# Patient Record
Sex: FEMALE | Race: WHITE | NOT HISPANIC OR LATINO | Employment: UNEMPLOYED | URBAN - METROPOLITAN AREA
[De-identification: names, ages, dates, MRNs, and addresses within clinical notes are randomized per-mention and may not be internally consistent; named-entity substitution may affect disease eponyms.]

---

## 2017-03-01 RX ORDER — ETONOGESTREL/ETHINYL ESTRADIOL .12-.015MG
RING, VAGINAL VAGINAL
Qty: 1 EACH | Refills: 4 | OUTPATIENT
Start: 2017-03-01

## 2019-06-06 ENCOUNTER — LAB (OUTPATIENT)
Dept: LAB | Facility: HOSPITAL | Age: 49
End: 2019-06-06

## 2019-06-06 ENCOUNTER — TRANSCRIBE ORDERS (OUTPATIENT)
Dept: ADMINISTRATIVE | Facility: HOSPITAL | Age: 49
End: 2019-06-06

## 2019-06-06 DIAGNOSIS — Z00.00 ROUTINE GENERAL MEDICAL EXAMINATION AT A HEALTH CARE FACILITY: ICD-10-CM

## 2019-06-06 DIAGNOSIS — Z00.00 ROUTINE GENERAL MEDICAL EXAMINATION AT A HEALTH CARE FACILITY: Primary | ICD-10-CM

## 2019-06-06 LAB
ALBUMIN SERPL-MCNC: 3.8 G/DL (ref 3.5–5.2)
ALBUMIN/GLOB SERPL: 1.4 G/DL
ALP SERPL-CCNC: 65 U/L (ref 39–117)
ALT SERPL W P-5'-P-CCNC: 16 U/L (ref 1–33)
ANION GAP SERPL CALCULATED.3IONS-SCNC: 11.4 MMOL/L
AST SERPL-CCNC: 21 U/L (ref 1–32)
BACTERIA UR QL AUTO: ABNORMAL /HPF
BASOPHILS # BLD AUTO: 0.06 10*3/MM3 (ref 0–0.2)
BASOPHILS NFR BLD AUTO: 1 % (ref 0–1.5)
BILIRUB SERPL-MCNC: 0.7 MG/DL (ref 0.2–1.2)
BILIRUB UR QL STRIP: NEGATIVE
BUN BLD-MCNC: 13 MG/DL (ref 6–20)
BUN/CREAT SERPL: 14.4 (ref 7–25)
CALCIUM SPEC-SCNC: 9.2 MG/DL (ref 8.6–10.5)
CHLORIDE SERPL-SCNC: 101 MMOL/L (ref 98–107)
CHOLEST SERPL-MCNC: 195 MG/DL (ref 0–200)
CLARITY UR: CLEAR
CO2 SERPL-SCNC: 24.6 MMOL/L (ref 22–29)
COLOR UR: YELLOW
CREAT BLD-MCNC: 0.9 MG/DL (ref 0.57–1)
DEPRECATED RDW RBC AUTO: 41.1 FL (ref 37–54)
EOSINOPHIL # BLD AUTO: 0.13 10*3/MM3 (ref 0–0.4)
EOSINOPHIL NFR BLD AUTO: 2.2 % (ref 0.3–6.2)
ERYTHROCYTE [DISTWIDTH] IN BLOOD BY AUTOMATED COUNT: 11.7 % (ref 12.3–15.4)
GFR SERPL CREATININE-BSD FRML MDRD: 66 ML/MIN/1.73
GLOBULIN UR ELPH-MCNC: 2.8 GM/DL
GLUCOSE BLD-MCNC: 89 MG/DL (ref 65–99)
GLUCOSE UR STRIP-MCNC: NEGATIVE MG/DL
HCT VFR BLD AUTO: 40.1 % (ref 34–46.6)
HDLC SERPL-MCNC: 96 MG/DL (ref 40–60)
HGB BLD-MCNC: 13.1 G/DL (ref 12–15.9)
HGB UR QL STRIP.AUTO: ABNORMAL
HYALINE CASTS UR QL AUTO: ABNORMAL /LPF
IMM GRANULOCYTES # BLD AUTO: 0.02 10*3/MM3 (ref 0–0.05)
IMM GRANULOCYTES NFR BLD AUTO: 0.3 % (ref 0–0.5)
KETONES UR QL STRIP: NEGATIVE
LDLC SERPL CALC-MCNC: 90 MG/DL (ref 0–100)
LDLC/HDLC SERPL: 0.94 {RATIO}
LEUKOCYTE ESTERASE UR QL STRIP.AUTO: ABNORMAL
LYMPHOCYTES # BLD AUTO: 1.53 10*3/MM3 (ref 0.7–3.1)
LYMPHOCYTES NFR BLD AUTO: 26.1 % (ref 19.6–45.3)
MCH RBC QN AUTO: 31.3 PG (ref 26.6–33)
MCHC RBC AUTO-ENTMCNC: 32.7 G/DL (ref 31.5–35.7)
MCV RBC AUTO: 95.7 FL (ref 79–97)
MONOCYTES # BLD AUTO: 0.47 10*3/MM3 (ref 0.1–0.9)
MONOCYTES NFR BLD AUTO: 8 % (ref 5–12)
NEUTROPHILS # BLD AUTO: 3.65 10*3/MM3 (ref 1.7–7)
NEUTROPHILS NFR BLD AUTO: 62.4 % (ref 42.7–76)
NITRITE UR QL STRIP: NEGATIVE
NRBC BLD AUTO-RTO: 0 /100 WBC (ref 0–0.2)
PH UR STRIP.AUTO: 5.5 [PH] (ref 5–8)
PLATELET # BLD AUTO: 218 10*3/MM3 (ref 140–450)
PMV BLD AUTO: 11.1 FL (ref 6–12)
POTASSIUM BLD-SCNC: 4.2 MMOL/L (ref 3.5–5.2)
PROT SERPL-MCNC: 6.6 G/DL (ref 6–8.5)
PROT UR QL STRIP: NEGATIVE
RBC # BLD AUTO: 4.19 10*6/MM3 (ref 3.77–5.28)
RBC # UR: ABNORMAL /HPF
REF LAB TEST METHOD: ABNORMAL
SODIUM BLD-SCNC: 137 MMOL/L (ref 136–145)
SP GR UR STRIP: 1.02 (ref 1–1.03)
SQUAMOUS #/AREA URNS HPF: ABNORMAL /HPF
TRIGL SERPL-MCNC: 45 MG/DL (ref 0–150)
UROBILINOGEN UR QL STRIP: ABNORMAL
VLDLC SERPL-MCNC: 9 MG/DL (ref 5–40)
WBC NRBC COR # BLD: 5.86 10*3/MM3 (ref 3.4–10.8)
WBC UR QL AUTO: ABNORMAL /HPF

## 2019-06-06 PROCEDURE — 36415 COLL VENOUS BLD VENIPUNCTURE: CPT

## 2019-06-06 PROCEDURE — 80061 LIPID PANEL: CPT | Performed by: INTERNAL MEDICINE

## 2019-06-06 PROCEDURE — 85025 COMPLETE CBC W/AUTO DIFF WBC: CPT | Performed by: INTERNAL MEDICINE

## 2019-06-06 PROCEDURE — 81001 URINALYSIS AUTO W/SCOPE: CPT | Performed by: INTERNAL MEDICINE

## 2019-06-06 PROCEDURE — 80053 COMPREHEN METABOLIC PANEL: CPT | Performed by: INTERNAL MEDICINE

## 2020-02-21 ENCOUNTER — TELEPHONE (OUTPATIENT)
Dept: OBSTETRICS AND GYNECOLOGY | Age: 50
End: 2020-02-21

## 2020-02-21 NOTE — TELEPHONE ENCOUNTER
Pt called to schedule AE. She could not remember who she saw because it has been over 3 years. It looks like she saw you in the past but pt is requesting to be seen out in [place] as it is more convenient for her. Pt was informed that you only see pts at [place] and was wondering if she could switch providers to a provider out at [place]. I told pt I would have to send a message back to you first and then I would give her a call back on Monday as you are not in office again until Monday.

## 2020-02-24 NOTE — TELEPHONE ENCOUNTER
Left voicemail that pt can see someone in [place]. Advised pt to call back if she has any questions.

## 2020-06-26 ENCOUNTER — OFFICE VISIT (OUTPATIENT)
Dept: OBSTETRICS AND GYNECOLOGY | Age: 50
End: 2020-06-26

## 2020-06-26 VITALS
WEIGHT: 142 LBS | DIASTOLIC BLOOD PRESSURE: 70 MMHG | BODY MASS INDEX: 24.24 KG/M2 | SYSTOLIC BLOOD PRESSURE: 110 MMHG | HEIGHT: 64 IN

## 2020-06-26 DIAGNOSIS — Z12.4 SCREENING FOR CERVICAL CANCER: ICD-10-CM

## 2020-06-26 DIAGNOSIS — Z12.31 SCREENING MAMMOGRAM, ENCOUNTER FOR: Primary | ICD-10-CM

## 2020-06-26 DIAGNOSIS — Z01.419 ENCOUNTER FOR GYNECOLOGICAL EXAMINATION WITHOUT ABNORMAL FINDING: ICD-10-CM

## 2020-06-26 PROCEDURE — 99386 PREV VISIT NEW AGE 40-64: CPT | Performed by: OBSTETRICS & GYNECOLOGY

## 2020-06-26 NOTE — PROGRESS NOTES
Routine Annual Visit    2020    Patient: Preethi Johnson          MR#:[id number]      Chief Complaint   Patient presents with   • Gynecologic Exam     New pt. no pap or MG on file. colonoscopy 4-5 months ago per pt. no complaints today        History of Present Illness    50 y.o. female  who presents for annual exam. She works in [place] but [facility]  Menopausal for about 15 months. Has hot flashes and have gotten better, mostly at night.  Not interested in HRT    Going through a divorce that has lasted a year, living with her  and all her family    Health Maintenance  Gardasil none  Last pap: more than 3 yrs ago, history abnormal: one when pregnant with second child, colpo but no leep and normal since  Mammogram: years ago  Colonoscopy 4-5 mo ago per pt, normal and 10 yr follow up  Family history of Breast, ovarian, uterine, colon, pancreatic cancer: yes - half sister with breast cancer  DEXA: none  PCP Dr. Roger Pickard     Current contraception: PM    No LMP recorded. Patient is postmenopausal.  Obstetric History:  OB History        3    Para   2    Term   2            AB   1    Living   2       SAB   1    TAB        Ectopic        Molar        Multiple        Live Births   2               Menstrual History:     No LMP recorded. Patient is postmenopausal.       ________________________________________  There is no problem list on file for this patient.      Past Medical History:   Diagnosis Date   • Migraine        Family History   Problem Relation Age of Onset   • Breast cancer Sister    • Ovarian cancer Neg Hx    • Uterine cancer Neg Hx    • Colon cancer Neg Hx    • Melanoma Neg Hx    • Prostate cancer Neg Hx        Past Surgical History:   Procedure Laterality Date   • TONSILLECTOMY         Social History     Tobacco Use   Smoking Status Never Smoker   Smokeless Tobacco Never Used       currently has no medications in their medication  "list.  ________________________________________      The following portions of the patient's history were reviewed and updated as appropriate: allergies, current medications, past family history, past medical history, past social history, past surgical history and problem list.    Review of Systems   Constitutional: Negative for fever and unexpected weight change.   Respiratory: Negative for shortness of breath.    Cardiovascular: Negative for chest pain.   Gastrointestinal: Negative for abdominal pain, constipation and diarrhea.   Genitourinary: Negative for frequency and urgency.   Neurological: Positive for headaches.   Hematological: Negative for adenopathy.   Psychiatric/Behavioral: Negative for dysphoric mood.       Objective   Physical Exam    /70   Ht 162.6 cm (64\")   Wt 64.4 kg (142 lb)   Breastfeeding No   BMI 24.37 kg/m²    BP Readings from Last 3 Encounters:   06/26/20 110/70      Wt Readings from Last 3 Encounters:   06/26/20 64.4 kg (142 lb)         BMI: Body mass index is 24.37 kg/m².       General:   alert, appears stated age and cooperative   Neck: No thyromegaly or LAD, no carotid bruit noted   Heart:: regular rate and rhythm, S1, S2 normal, no murmur, click, rub or gallop   Lungs: normal respiratory effort and auscultation   Abdomen: soft, non-tender, without masses or organomegaly   Breast: inspection negative, no nipple discharge or bleeding, no masses or nodularity palpable   Urethra and bladder: urethral meatus normal; bladder nontender to palpation;   Vulva: normal, Bartholin's, Urethra, [place]'s normal   Vagina: atrophic mucosa, normal discharge   Cervix: multiparous appearance and no lesions   Uterus: normal size or anteverted   Adnexa: normal adnexa and no mass, fullness, tenderness       Assessment:    normal annual exam   Menopause  Screen for breast and cervical cancer    Plan:    Plan     [x]  Mammogram request made  [x]  PAP done  []  Labs:   []  GC/Chl/TV  []  DEXA scan   [] "  Referral for colonoscopy:         Counseling  Reviewed changes a/w menopause, denies need for HRT  [x]  Nutrition  [x]  Physical activity/regular exercise   [x]  Healthy weight  []  Injury prevention  []  Smoking cessation  []  Substance misuse/abuse  [x]  Sexual behavior  []  STD prevention  []  Contraception  []  Dental health  [x]  Mental health  []  Immunization  [x]  Encouraged REHANA Patel MD  06/26/2020  10:00

## 2020-07-02 LAB
CYTOLOGIST CVX/VAG CYTO: NORMAL
CYTOLOGY CVX/VAG DOC CYTO: NORMAL
CYTOLOGY CVX/VAG DOC THIN PREP: NORMAL
DX ICD CODE: NORMAL
HIV 1 & 2 AB SER-IMP: NORMAL
HPV I/H RISK 1 DNA CVX QL PROBE+SIG AMP: NEGATIVE
Lab: NORMAL
OTHER STN SPEC: NORMAL
RECOM F/U CVX/VAG CYTO: NORMAL
STAT OF ADQ CVX/VAG CYTO-IMP: NORMAL

## 2020-08-04 ENCOUNTER — OFFICE VISIT (OUTPATIENT)
Dept: OBSTETRICS AND GYNECOLOGY | Age: 50
End: 2020-08-04

## 2020-08-04 VITALS
SYSTOLIC BLOOD PRESSURE: 102 MMHG | HEIGHT: 64 IN | BODY MASS INDEX: 24.41 KG/M2 | WEIGHT: 143 LBS | DIASTOLIC BLOOD PRESSURE: 60 MMHG

## 2020-08-04 DIAGNOSIS — Z12.4 SCREENING FOR MALIGNANT NEOPLASM OF CERVIX: Primary | ICD-10-CM

## 2020-08-04 NOTE — PROGRESS NOTES
"Chief Complaint   Patient presents with   • Follow-up     repeat pap smear. last pap 6/26/20 (-)      Preethi Johnson  Is here for repeat pap only, collected 6/26 but was not satisfactory.   No interval issues    /60   Ht 162.6 cm (64\")   Wt 64.9 kg (143 lb)   Breastfeeding No   BMI 24.55 kg/m²   Well, no distress  Pelvic with normal appearing cervix, slight atrophy  Pap collected    A/P    Repeat pap for insufficient cellularity    No charge for visit    Madeleine Patel MD   8/4/2020  12:32    "

## 2020-08-08 LAB
CYTOLOGIST CVX/VAG CYTO: NORMAL
CYTOLOGY CVX/VAG DOC CYTO: NORMAL
CYTOLOGY CVX/VAG DOC THIN PREP: NORMAL
DX ICD CODE: NORMAL
HIV 1 & 2 AB SER-IMP: NORMAL
HPV I/H RISK 4 DNA CVX QL PROBE+SIG AMP: NEGATIVE
OTHER STN SPEC: NORMAL
STAT OF ADQ CVX/VAG CYTO-IMP: NORMAL

## 2020-08-11 ENCOUNTER — HOSPITAL ENCOUNTER (OUTPATIENT)
Dept: MAMMOGRAPHY | Facility: HOSPITAL | Age: 50
Discharge: HOME | End: 2020-08-11
Admitting: OBSTETRICS & GYNECOLOGY

## 2020-08-11 DIAGNOSIS — R92.8 ABNORMAL MAMMOGRAM: Primary | ICD-10-CM

## 2020-08-11 PROCEDURE — 77063 BREAST TOMOSYNTHESIS BI: CPT

## 2020-08-11 PROCEDURE — 77067 SCR MAMMO BI INCL CAD: CPT

## 2020-08-24 ENCOUNTER — HOSPITAL ENCOUNTER (OUTPATIENT)
Dept: MAMMOGRAPHY | Facility: HOSPITAL | Age: 50
Discharge: HOME | End: 2020-08-24
Admitting: OBSTETRICS & GYNECOLOGY

## 2020-08-24 ENCOUNTER — HOSPITAL ENCOUNTER (OUTPATIENT)
Dept: ULTRASOUND IMAGING | Facility: HOSPITAL | Age: 50
Discharge: HOME | End: 2020-08-24

## 2020-08-24 DIAGNOSIS — R92.8 ABNORMAL MAMMOGRAM: ICD-10-CM

## 2020-08-24 PROCEDURE — 76642 ULTRASOUND BREAST LIMITED: CPT

## 2020-08-24 PROCEDURE — G0279 TOMOSYNTHESIS, MAMMO: HCPCS

## 2020-08-24 PROCEDURE — 77065 DX MAMMO INCL CAD UNI: CPT

## 2020-08-24 NOTE — PROGRESS NOTES
Called pt and notified that her mammogram and ultrasound were benign, there was a small subcentimeter cyst of the right breast.  Recommended repeat mammogram 1 yr    Madeleine Patel MD  8/24/2020  18:58

## 2021-08-10 ENCOUNTER — OFFICE VISIT (OUTPATIENT)
Dept: OBSTETRICS AND GYNECOLOGY | Age: 51
End: 2021-08-10

## 2021-08-10 VITALS
WEIGHT: 144.2 LBS | DIASTOLIC BLOOD PRESSURE: 72 MMHG | HEIGHT: 64 IN | BODY MASS INDEX: 24.62 KG/M2 | SYSTOLIC BLOOD PRESSURE: 114 MMHG

## 2021-08-10 DIAGNOSIS — Z80.3 FAMILY HISTORY OF BREAST CANCER: Primary | ICD-10-CM

## 2021-08-10 PROCEDURE — 99396 PREV VISIT EST AGE 40-64: CPT | Performed by: OBSTETRICS & GYNECOLOGY

## 2021-08-10 NOTE — PROGRESS NOTES
Routine Annual Visit    8/10/2021    Patient: Preethi Johnson          MR#:[id number]      Chief Complaint   Patient presents with   • Gynecologic Exam     AE Today, Last AE 2020 (-), HPV (-), MG 2020, Colonoscopy up to date per pt. Pt has no complaints.        History of Present Illness    51 y.o. female  who presents for annual exam.   She is doing well, no complaints  No hot flashes, no bleeding  Not SA, divorce final and she is happy about it  Received COVID vaccine    Normal Pap and mammogram last year  Up-to-date with her colonoscopy    No LMP recorded. Patient is postmenopausal.  Obstetric History:  OB History        3    Para   2    Term   2            AB   1    Living   2       SAB   1    TAB        Ectopic        Molar        Multiple        Live Births   2               Menstrual History:     No LMP recorded. Patient is postmenopausal.       ________________________________________  There is no problem list on file for this patient.      Past Medical History:   Diagnosis Date   • Migraine        Family History   Problem Relation Age of Onset   • Breast cancer Half-Sister    • Ovarian cancer Neg Hx    • Uterine cancer Neg Hx    • Colon cancer Neg Hx    • Melanoma Neg Hx    • Prostate cancer Neg Hx        Past Surgical History:   Procedure Laterality Date   • TONSILLECTOMY         Social History     Tobacco Use   Smoking Status Never Smoker   Smokeless Tobacco Never Used       currently has no medications in their medication list.  ________________________________________      Review of Systems   Constitutional: Negative for fever and unexpected weight change.   Respiratory: Negative for shortness of breath.    Cardiovascular: Negative for chest pain.   Gastrointestinal: Negative for abdominal pain, constipation and diarrhea.   Genitourinary: Negative for frequency and urgency.   Hematological: Negative for adenopathy.   Psychiatric/Behavioral: Negative for dysphoric mood.  "      Objective   Physical Exam    /72   Ht 162.6 cm (64\")   Wt 65.4 kg (144 lb 3.2 oz)   Breastfeeding No   BMI 24.75 kg/m²    BP Readings from Last 3 Encounters:   08/10/21 114/72   08/04/20 102/60   06/26/20 110/70      Wt Readings from Last 3 Encounters:   08/10/21 65.4 kg (144 lb 3.2 oz)   08/04/20 64.9 kg (143 lb)   06/26/20 64.4 kg (142 lb)         BMI: Body mass index is 24.75 kg/m².       General:   alert, appears stated age and cooperative   Neck: No thyromegaly or LAD, no carotid bruit noted   Heart:: regular rate and rhythm, S1, S2 normal, no murmur, click, rub or gallop   Lungs: normal respiratory effort and auscultation   Abdomen: soft, non-tender, without masses or organomegaly   Breast: inspection negative, no nipple discharge or bleeding, no masses or nodularity palpable   Urethra and bladder: urethral meatus normal; bladder nontender to palpation;   Vulva: normal, Bartholin's, Urethra, [place]'s normal   Vagina: normal mucosa, normal discharge   Cervix: multiparous appearance and no lesions   Uterus: normal size and anteverted   Adnexa: normal adnexa and no mass, fullness, tenderness       Assessment:    normal annual exam     Plan:    Plan     [x]  Mammogram request made  []  PAP done UTD  []  Labs:   []  GC/Chl/TV  []  DEXA scan   []  Referral for colonoscopy:       Counseling  [x]  Nutrition  [x]  Physical activity/regular exercise   [x]  Healthy weight  []  Injury prevention  []  Smoking cessation  []  Substance misuse/abuse  [x]  Sexual behavior  []  STD prevention  []  Contraception  []  Dental health  []  Mental health  [x]  Immunization received covid vaccine  [x]  Encouraged REHANA Patel MD  08/10/2021  13:19 EDT    "

## 2022-08-23 ENCOUNTER — OFFICE VISIT (OUTPATIENT)
Dept: OBSTETRICS AND GYNECOLOGY | Age: 52
End: 2022-08-23

## 2022-08-23 VITALS
DIASTOLIC BLOOD PRESSURE: 68 MMHG | WEIGHT: 122.6 LBS | BODY MASS INDEX: 20.93 KG/M2 | SYSTOLIC BLOOD PRESSURE: 114 MMHG | HEIGHT: 64 IN

## 2022-08-23 DIAGNOSIS — Z01.419 ENCOUNTER FOR GYNECOLOGICAL EXAMINATION WITHOUT ABNORMAL FINDING: ICD-10-CM

## 2022-08-23 DIAGNOSIS — Z12.31 ENCOUNTER FOR SCREENING MAMMOGRAM FOR MALIGNANT NEOPLASM OF BREAST: Primary | ICD-10-CM

## 2022-08-23 PROCEDURE — 99396 PREV VISIT EST AGE 40-64: CPT | Performed by: OBSTETRICS & GYNECOLOGY

## 2022-08-23 NOTE — PROGRESS NOTES
Routine Annual Visit    2022    Patient: Preethi Johnson          MR#:[id number]      Chief Complaint   Patient presents with   • Gynecologic Exam     AE Today, Last AE 8/10/2021, Last pap 2020 (-), HPV (-), MG 2020, Colonoscopy up to date per pt.        History of Present Illness    52 y.o. female  who presents for annual exam.   Would  Like to exercise more, just working all the time  Not SA,  No plans  No urinary complaints  Wonders about bone density, plan to start age 58 with dexa    Health Maintenance  Last pap:   Mammogram:   Colonoscopy UTD  Family history of Breast, ovarian, uterine, colon, pancreatic cancer: yes - half sister breast ca    No LMP recorded. Patient is postmenopausal.  Obstetric History:  OB History        3    Para   2    Term   2            AB   1    Living   2       SAB   1    IAB        Ectopic        Molar        Multiple        Live Births   2               Menstrual History:     No LMP recorded. Patient is postmenopausal.       ________________________________________  There is no problem list on file for this patient.      Past Medical History:   Diagnosis Date   • Migraine        Family History   Problem Relation Age of Onset   • Breast cancer Half-Sister    • Ovarian cancer Neg Hx    • Uterine cancer Neg Hx    • Colon cancer Neg Hx    • Melanoma Neg Hx    • Prostate cancer Neg Hx        Past Surgical History:   Procedure Laterality Date   • TONSILLECTOMY         Social History     Tobacco Use   Smoking Status Never Smoker   Smokeless Tobacco Never Used       currently has no medications in their medication list.  ________________________________________      Review of Systems   Constitutional: Negative for fever and unexpected weight change.   Respiratory: Negative for shortness of breath.    Cardiovascular: Negative for chest pain.   Gastrointestinal: Negative for abdominal pain, constipation and diarrhea.   Genitourinary: Negative for  "frequency and urgency.   Hematological: Negative for adenopathy.   Psychiatric/Behavioral: Negative for dysphoric mood.       Objective   Physical Exam    /68   Ht 162.6 cm (64\")   Wt 55.6 kg (122 lb 9.6 oz)   Breastfeeding No   BMI 21.04 kg/m²    BP Readings from Last 3 Encounters:   08/23/22 114/68   08/10/21 114/72   08/04/20 102/60      Wt Readings from Last 3 Encounters:   08/23/22 55.6 kg (122 lb 9.6 oz)   08/10/21 65.4 kg (144 lb 3.2 oz)   08/04/20 64.9 kg (143 lb)         BMI: Body mass index is 21.04 kg/m².       General:   alert, appears stated age and cooperative   Neck: No thyromegaly or LAD   Heart:: regular rate and rhythm, S1, S2 normal, no murmur, click, rub or gallop   Lungs: normal respiratory effort and auscultation   Abdomen: soft, non-tender, without masses or organomegaly   Breast: inspection negative, no nipple discharge or bleeding, no masses or nodularity palpable   Urethra and bladder: urethral meatus normal; bladder nontender to palpation;   Vulva: normal, Bartholin's, Urethra, [place]'s normal   Vagina: normal mucosa, normal discharge   Cervix: no lesions and nulliparous appearance   Uterus: normal size and anteverted   Adnexa: normal adnexa and no mass, fullness, tenderness       Assessment:    normal annual exam   menopause    Plan:    Plan     [x]  Mammogram request made  []  PAP done  []  Labs:   []  GC/Chl/TV  []  DEXA scan   []  Referral for colonoscopy:       Counseling  [x]  Nutrition  [x]  Physical activity/regular exercise   [x]  Healthy weight  []  Injury prevention  []  Smoking cessation  []  Substance misuse/abuse  []  Sexual behavior  []  STD prevention  []  Contraception  []  Dental health  []  Mental health  []  Immunization  [x]  Encouraged SBE        Madeleine Patel MD  08/23/2022  13:20 EDT    "

## 2022-09-02 ENCOUNTER — APPOINTMENT (OUTPATIENT)
Dept: MAMMOGRAPHY | Facility: HOSPITAL | Age: 52
End: 2022-09-02

## 2023-08-29 ENCOUNTER — OFFICE VISIT (OUTPATIENT)
Dept: OBSTETRICS AND GYNECOLOGY | Age: 53
End: 2023-08-29
Payer: COMMERCIAL

## 2023-08-29 VITALS
BODY MASS INDEX: 18.95 KG/M2 | HEIGHT: 64 IN | DIASTOLIC BLOOD PRESSURE: 64 MMHG | WEIGHT: 111 LBS | SYSTOLIC BLOOD PRESSURE: 120 MMHG

## 2023-08-29 DIAGNOSIS — Z12.4 SCREENING FOR CERVICAL CANCER: ICD-10-CM

## 2023-08-29 DIAGNOSIS — Z11.59 NEED FOR HEPATITIS C SCREENING TEST: Primary | ICD-10-CM

## 2023-08-29 DIAGNOSIS — Z12.31 ENCOUNTER FOR SCREENING MAMMOGRAM FOR MALIGNANT NEOPLASM OF BREAST: ICD-10-CM

## 2023-08-29 DIAGNOSIS — Z77.9 OTHER CONTACT WITH AND (SUSPECTED) EXPOSURES HAZARDOUS TO HEALTH: ICD-10-CM

## 2023-08-29 DIAGNOSIS — Z00.00 HEALTHCARE MAINTENANCE: ICD-10-CM

## 2023-08-29 DIAGNOSIS — Z01.419 WELL WOMAN EXAM WITH ROUTINE GYNECOLOGICAL EXAM: ICD-10-CM

## 2023-08-29 DIAGNOSIS — Z11.51 SCREENING FOR HUMAN PAPILLOMAVIRUS (HPV): ICD-10-CM

## 2023-08-29 RX ORDER — ESTRADIOL 0.1 MG/G
CREAM VAGINAL
Qty: 42.5 G | Refills: 12 | Status: SHIPPED | OUTPATIENT
Start: 2023-08-29

## 2023-08-29 NOTE — PROGRESS NOTES
Routine Annual Visit    2023    Patient: Preethi Johnson          MR#:[id number]      Chief Complaint   Patient presents with    Gynecologic Exam     CC: Annual, last pap 20 neg, HPV neg, last mammo 20 neg, desires blood work       History of Present Illness    53 y.o. female  who presents for annual exam. Desires health maintenance blood work as well though she has pcp  Has not been SA for year but started to date and attempted IC but was painful.      No LMP recorded. Patient is postmenopausal.  Obstetric History:  OB History          3    Para   2    Term   2            AB   1    Living   2         SAB   1    IAB        Ectopic        Molar        Multiple        Live Births   2               Menstrual History:     No LMP recorded. Patient is postmenopausal.       ________________________________________  There is no problem list on file for this patient.      Past Medical History:   Diagnosis Date    Migraine        Family History   Problem Relation Age of Onset    Breast cancer Half-Sister     Ovarian cancer Neg Hx     Uterine cancer Neg Hx     Colon cancer Neg Hx     Melanoma Neg Hx     Prostate cancer Neg Hx        Past Surgical History:   Procedure Laterality Date    TONSILLECTOMY         Social History     Tobacco Use   Smoking Status Never    Passive exposure: Never   Smokeless Tobacco Never       has a current medication list which includes the following prescription(s): estradiol.  ________________________________________      Review of Systems   Constitutional:  Negative for fever and unexpected weight change.   Respiratory:  Negative for shortness of breath.    Cardiovascular:  Negative for chest pain.   Gastrointestinal:  Negative for abdominal pain, constipation and diarrhea.   Genitourinary:  Positive for dyspareunia. Negative for frequency and urgency.   Hematological:  Negative for adenopathy.   Psychiatric/Behavioral:  Negative for dysphoric mood.      Objective  "  Physical Exam    /64   Ht 162.6 cm (64\")   Wt 50.3 kg (111 lb)   BMI 19.05 kg/mý    BP Readings from Last 3 Encounters:   08/29/23 120/64   08/23/22 114/68   08/10/21 114/72      Wt Readings from Last 3 Encounters:   08/29/23 50.3 kg (111 lb)   08/23/22 55.6 kg (122 lb 9.6 oz)   08/10/21 65.4 kg (144 lb 3.2 oz)         BMI: Body mass index is 19.05 kg/mý.       General:   alert, appears stated age, and cooperative   Neck: No thyromegaly or LAD   Heart:: regular rate and rhythm, S1, S2 normal, no murmur, click, rub or gallop   Lungs: normal respiratory effort and auscultation   Abdomen: soft, non-tender, without masses or organomegaly   Breast: inspection negative, no nipple discharge or bleeding, no masses or nodularity palpable   Urethra and bladder: urethral meatus normal; bladder nontender to palpation;   Vulva: normal, Bartholin's, Urethra, [place]'s normal   Vagina: normal mucosa, normal discharge, atrophic. Tiny tear in the posterior fourchette   Cervix: no lesions and nulliparous appearance   Uterus: normal size, non-tender, and mid plane    Adnexa: normal adnexa and no mass, fullness, tenderness       Assessment:    normal annual exam   Vaginal atrophy  dyspareunia    Plan:    Plan     [x]  Mammogram request made  [x]  PAP done  []  Labs:   []  GC/Chl/TV  []  DEXA scan   []  Referral for colonoscopy:     Diagnoses and all orders for this visit:    1. Need for hepatitis C screening test (Primary)  -     Hepatitis C Antibody    2. Healthcare maintenance  -     CBC (No Diff)  -     Lipid Panel  -     Hemoglobin A1c  -     TSH Rfx On Abnormal To Free T4  -     Basic Metabolic Panel    3. Other contact with and (suspected) exposures hazardous to health    4. Encounter for screening mammogram for malignant neoplasm of breast  -     Mammo Screening Digital Tomosynthesis Bilateral With CAD; Future    5. Well woman exam with routine gynecological exam  -     IGP, Apt HPV,rfx 16 / 18,45    6. Screening for " cervical cancer  -     IGP, Apt HPV,rfx 16 / 18,45    7. Screening for human papillomavirus (HPV)  -     IGP, Apt HPV,rfx 16 / 18,45    Other orders  -     estradiol (ESTRACE VAGINAL) 0.1 MG/GM vaginal cream; Insert vaginally at night as instructed.  Dispense: 42.5 g; Refill: 12          Counseling  [x]  Nutrition  [x]  Physical activity/regular exercise   [x]  Healthy weight  []  Injury prevention  []  Smoking cessation  []  Substance misuse/abuse  [x]  Sexual behavior  []  STD prevention  []  Contraception  []  Dental health  []  Mental health  []  Immunization  [x]  Encouraged SBE        Madeleine Patel MD  08/29/2023  11:58 EDT

## 2023-08-30 LAB
BUN SERPL-MCNC: 17 MG/DL (ref 6–24)
BUN/CREAT SERPL: 18 (ref 9–23)
CALCIUM SERPL-MCNC: 9.4 MG/DL (ref 8.7–10.2)
CHLORIDE SERPL-SCNC: 103 MMOL/L (ref 96–106)
CHOLEST SERPL-MCNC: 234 MG/DL (ref 100–199)
CO2 SERPL-SCNC: 23 MMOL/L (ref 20–29)
CREAT SERPL-MCNC: 0.94 MG/DL (ref 0.57–1)
EGFRCR SERPLBLD CKD-EPI 2021: 72 ML/MIN/1.73
ERYTHROCYTE [DISTWIDTH] IN BLOOD BY AUTOMATED COUNT: 12 % (ref 11.7–15.4)
GLUCOSE SERPL-MCNC: 89 MG/DL (ref 70–99)
HBA1C MFR BLD: 5.6 % (ref 4.8–5.6)
HCT VFR BLD AUTO: 41.9 % (ref 34–46.6)
HCV IGG SERPL QL IA: NON REACTIVE
HDLC SERPL-MCNC: 102 MG/DL
HGB BLD-MCNC: 14.2 G/DL (ref 11.1–15.9)
LDLC SERPL CALC-MCNC: 117 MG/DL (ref 0–99)
MCH RBC QN AUTO: 31.6 PG (ref 26.6–33)
MCHC RBC AUTO-ENTMCNC: 33.9 G/DL (ref 31.5–35.7)
MCV RBC AUTO: 93 FL (ref 79–97)
PLATELET # BLD AUTO: 245 X10E3/UL (ref 150–450)
POTASSIUM SERPL-SCNC: 3.6 MMOL/L (ref 3.5–5.2)
RBC # BLD AUTO: 4.5 X10E6/UL (ref 3.77–5.28)
SODIUM SERPL-SCNC: 141 MMOL/L (ref 134–144)
TRIGL SERPL-MCNC: 90 MG/DL (ref 0–149)
TSH SERPL DL<=0.005 MIU/L-ACNC: 0.77 UIU/ML (ref 0.45–4.5)
VLDLC SERPL CALC-MCNC: 15 MG/DL (ref 5–40)
WBC # BLD AUTO: 6.9 X10E3/UL (ref 3.4–10.8)

## 2024-05-24 ENCOUNTER — PATIENT ROUNDING (BHMG ONLY) (OUTPATIENT)
Age: 54
End: 2024-05-24
Payer: COMMERCIAL

## 2024-05-24 NOTE — ED NOTES
Thank you for letting us care for you in your recent visit to our [facility] urgent care center. We would love to hear about your experience with us. Was this the first time you have visited our location?    We’re always looking for ways to make our patients’ experiences even better. Do you have any recommendations on ways we may improve?     I appreciate you taking the time to respond. Please be on the lookout for a survey about your recent visit from [facility] [employer] via text or email. We would greatly appreciate if you could fill that out and turn it back in. We want your voice to be heard and we value your feedback.   Thank you for choosing [facility] for your healthcare needs.     If you have concerns or would like to speak to me in person regarding your visit please feel free to give me a call. [phone]    Hope you get well soon and thank you.    López Lopez LPN Practice Manager

## 2024-05-25 ENCOUNTER — TELEPHONE (OUTPATIENT)
Age: 54
End: 2024-05-25
Payer: COMMERCIAL

## 2024-05-25 DIAGNOSIS — A49.9 UTI (URINARY TRACT INFECTION), BACTERIAL: Primary | ICD-10-CM

## 2024-05-25 DIAGNOSIS — N39.0 UTI (URINARY TRACT INFECTION), BACTERIAL: Primary | ICD-10-CM

## 2024-05-25 RX ORDER — CEFDINIR 300 MG/1
300 CAPSULE ORAL 2 TIMES DAILY
Qty: 20 CAPSULE | Refills: 0 | Status: SHIPPED | OUTPATIENT
Start: 2024-05-25 | End: 2024-05-25 | Stop reason: SDUPTHER

## 2024-06-27 ENCOUNTER — TELEPHONE (OUTPATIENT)
Dept: URGENT CARE | Facility: CLINIC | Age: 54
End: 2024-06-27
Payer: COMMERCIAL

## 2024-06-28 ENCOUNTER — TELEPHONE (OUTPATIENT)
Dept: OBSTETRICS AND GYNECOLOGY | Age: 54
End: 2024-06-28
Payer: COMMERCIAL

## 2024-06-28 DIAGNOSIS — N39.0 FREQUENT UTI: Primary | ICD-10-CM

## 2024-06-28 NOTE — TELEPHONE ENCOUNTER
Caller: Preethi Johnson    Relationship: Self    Best call back number: [phone]    What is the medical concern/diagnosis: RECURRENT UTI'S    What specialty or service is being requested: UROGYN    What is the provider, practice or medical service name: WOULD LIKE A RECOMMENDATION - IN [place]  DR CARLEEN DUENAS/ [facility] IS WHO THE IMMEDIATE CARE RECOMMENDED    What is the office location: [place]    Any additional details: PLEASE SEND REFERRAL OVER ASAP AND CALL PT TO UPDATE ON THE STATUS.

## 2024-06-28 NOTE — TELEPHONE ENCOUNTER
Patient requesting referral to urogynecology when looking in chart has 2 active referrals spoke to patient and let her know

## 2024-06-28 NOTE — TELEPHONE ENCOUNTER
Caller: Preethi Johnson    Relationship: Self    Best call back number:    [phone]    What is the best time to reach you: ANY    Who are you requesting to speak with (clinical staff, provider,  specific staff member):     DR FISCHER OR NURSE    What was the call regarding:     PT CALLING TO CHECK STATUS OF UROGYN REFERRAL    PT WAS ADVISED BY UROGYN OFFICE THAT THEY WOULD NOT ACCEPT REFERRAL FROM [facility]    REFERRAL MUST BE FROM DR FISCHER    PLEASE FAX NEW REFERRAL -[phone]

## 2024-07-02 NOTE — TELEPHONE ENCOUNTER
Caller: Preethi Johnson     Relationship: Self     Best call back number: [phone] / LVM     What is the best time to reach you: ANY     Who are you requesting to speak with (clinical staff, provider,  specific staff member):  DR FISCHER OR NURSE     What was the call regarding:  PT CALLING TO CHECK STATUS OF UROGYN REFERRAL - PT WAS TOLD BY THE UROGYN OFFICE THAT THE REFERRAL MUST COME FROM DR FISCHER AND NOT AN URGENT CARE OFFICE AND THE REFERRAL MUST STATE THAT IT IS URGENT IN ORDER FOR THE UROGYN TO GET THE PT IN SOONER THAN SEPT 17TH      REFERRAL MUST BE FROM DR FISCEHR     PLEASE FAX NEW REFERRAL -[phone] AND CALL THE PT TO LET HER KNOW IF THIS REQUEST CAN PLEASE BE MADE     THANK YOU!

## 2024-07-08 DIAGNOSIS — N39.0 RECURRENT UTI: Primary | ICD-10-CM

## 2024-07-09 NOTE — TELEPHONE ENCOUNTER
I spoke to pt and she is aware that referral has been faxed over. She will call to schedule an appt.

## 2024-08-30 ENCOUNTER — TELEPHONE (OUTPATIENT)
Dept: OBSTETRICS AND GYNECOLOGY | Age: 54
End: 2024-08-30
Payer: COMMERCIAL

## 2024-08-30 NOTE — TELEPHONE ENCOUNTER
----- Message from Elsie HAUSER sent at 8/27/2024 11:26 AM EDT -----  Regarding: FW: Appointment Cancellation Request  Contact: [phone]    ----- Message -----  From: Preethi Johnson  Sent: 8/27/2024  11:13 AM EDT  To: [facility]  Subject: Appointment Cancellation Request                 Preethi Johnson would like to cancel the following appointments:    Madeleine Patel in [facility] ([id number]), 9/3/2024 10:45 AM    Comments:  I just had to see a urogynecologist last month.  I still have to pay that bill and cannot afford another one right now.  I'm not due for a pap test this year and I will go get my mammogram, so can you please reschedule me for next year?

## 2024-09-19 RX ORDER — ESTRADIOL 0.1 MG/G
CREAM VAGINAL
Qty: 42.5 G | Refills: 12 | Status: SHIPPED | OUTPATIENT
Start: 2024-09-19

## 2024-09-26 ENCOUNTER — OFFICE VISIT (OUTPATIENT)
Dept: INTERNAL MEDICINE | Facility: CLINIC | Age: 54
End: 2024-09-26
Payer: MEDICAID

## 2024-09-26 VITALS
TEMPERATURE: 98.2 F | OXYGEN SATURATION: 96 % | HEART RATE: 75 BPM | BODY MASS INDEX: 22.2 KG/M2 | HEIGHT: 64 IN | RESPIRATION RATE: 16 BRPM | WEIGHT: 130 LBS | SYSTOLIC BLOOD PRESSURE: 118 MMHG | DIASTOLIC BLOOD PRESSURE: 72 MMHG

## 2024-09-26 DIAGNOSIS — Z00.00 ANNUAL PHYSICAL EXAM: Primary | ICD-10-CM

## 2024-09-26 DIAGNOSIS — Z13.220 SCREENING CHOLESTEROL LEVEL: ICD-10-CM

## 2024-09-26 DIAGNOSIS — Z87.440 HISTORY OF UTI: ICD-10-CM

## 2024-09-26 DIAGNOSIS — E55.9 VITAMIN D DEFICIENCY: ICD-10-CM

## 2024-09-26 DIAGNOSIS — Z13.1 SCREENING FOR DIABETES MELLITUS: ICD-10-CM

## 2024-09-26 DIAGNOSIS — Z00.00 ENCOUNTER FOR MEDICAL EXAMINATION TO ESTABLISH CARE: ICD-10-CM

## 2024-09-26 LAB
25(OH)D3+25(OH)D2 SERPL-MCNC: 42.5 NG/ML (ref 30–100)
ALBUMIN SERPL-MCNC: 4.6 G/DL (ref 3.5–5.2)
ALBUMIN/GLOB SERPL: 2.3 G/DL
ALP SERPL-CCNC: 86 U/L (ref 39–117)
ALT SERPL-CCNC: 12 U/L (ref 1–33)
AST SERPL-CCNC: 21 U/L (ref 1–32)
BASOPHILS # BLD AUTO: 0.05 10*3/MM3 (ref 0–0.2)
BASOPHILS NFR BLD AUTO: 0.8 % (ref 0–1.5)
BILIRUB SERPL-MCNC: 0.7 MG/DL (ref 0–1.2)
BUN SERPL-MCNC: 17 MG/DL (ref 6–20)
BUN/CREAT SERPL: 18.9 (ref 7–25)
CALCIUM SERPL-MCNC: 9.1 MG/DL (ref 8.6–10.5)
CHLORIDE SERPL-SCNC: 105 MMOL/L (ref 98–107)
CHOLEST SERPL-MCNC: 237 MG/DL (ref 0–200)
CO2 SERPL-SCNC: 26.4 MMOL/L (ref 22–29)
CREAT SERPL-MCNC: 0.9 MG/DL (ref 0.57–1)
EGFRCR SERPLBLD CKD-EPI 2021: 75.2 ML/MIN/1.73
EOSINOPHIL # BLD AUTO: 0.09 10*3/MM3 (ref 0–0.4)
EOSINOPHIL NFR BLD AUTO: 1.5 % (ref 0.3–6.2)
ERYTHROCYTE [DISTWIDTH] IN BLOOD BY AUTOMATED COUNT: 11.8 % (ref 12.3–15.4)
GLOBULIN SER CALC-MCNC: 2 GM/DL
GLUCOSE SERPL-MCNC: 86 MG/DL (ref 65–99)
HBA1C MFR BLD: 5.5 % (ref 4.8–5.6)
HCT VFR BLD AUTO: 39.1 % (ref 34–46.6)
HDLC SERPL-MCNC: 94 MG/DL (ref 40–60)
HGB BLD-MCNC: 13.6 G/DL (ref 12–15.9)
IMM GRANULOCYTES # BLD AUTO: 0.02 10*3/MM3 (ref 0–0.05)
IMM GRANULOCYTES NFR BLD AUTO: 0.3 % (ref 0–0.5)
LDLC SERPL CALC-MCNC: 130 MG/DL (ref 0–100)
LYMPHOCYTES # BLD AUTO: 1.63 10*3/MM3 (ref 0.7–3.1)
LYMPHOCYTES NFR BLD AUTO: 26.4 % (ref 19.6–45.3)
MCH RBC QN AUTO: 31.6 PG (ref 26.6–33)
MCHC RBC AUTO-ENTMCNC: 34.8 G/DL (ref 31.5–35.7)
MCV RBC AUTO: 90.7 FL (ref 79–97)
MONOCYTES # BLD AUTO: 0.41 10*3/MM3 (ref 0.1–0.9)
MONOCYTES NFR BLD AUTO: 6.6 % (ref 5–12)
NEUTROPHILS # BLD AUTO: 3.97 10*3/MM3 (ref 1.7–7)
NEUTROPHILS NFR BLD AUTO: 64.4 % (ref 42.7–76)
NRBC BLD AUTO-RTO: 0 /100 WBC (ref 0–0.2)
PLATELET # BLD AUTO: 255 10*3/MM3 (ref 140–450)
POTASSIUM SERPL-SCNC: 3.9 MMOL/L (ref 3.5–5.2)
PROT SERPL-MCNC: 6.6 G/DL (ref 6–8.5)
RBC # BLD AUTO: 4.31 10*6/MM3 (ref 3.77–5.28)
SODIUM SERPL-SCNC: 142 MMOL/L (ref 136–145)
TRIGL SERPL-MCNC: 79 MG/DL (ref 0–150)
VLDLC SERPL CALC-MCNC: 13 MG/DL (ref 5–40)
WBC # BLD AUTO: 6.17 10*3/MM3 (ref 3.4–10.8)

## 2024-09-27 LAB
APPEARANCE UR: CLEAR
BACTERIA #/AREA URNS HPF: NORMAL /[HPF]
BILIRUB UR QL STRIP: NEGATIVE
CASTS URNS QL MICRO: NORMAL /LPF
COLOR UR: YELLOW
EPI CELLS #/AREA URNS HPF: NORMAL /HPF (ref 0–10)
GLUCOSE UR QL STRIP: NEGATIVE
HGB UR QL STRIP: NEGATIVE
KETONES UR QL STRIP: NEGATIVE
LEUKOCYTE ESTERASE UR QL STRIP: NEGATIVE
MICRO URNS: NORMAL
MICRO URNS: NORMAL
NITRITE UR QL STRIP: NEGATIVE
PH UR STRIP: 5.5 [PH] (ref 5–7.5)
PROT UR QL STRIP: NEGATIVE
RBC #/AREA URNS HPF: NORMAL /HPF (ref 0–2)
SP GR UR STRIP: 1.03 (ref 1–1.03)
URINALYSIS REFLEX: NORMAL
UROBILINOGEN UR STRIP-MCNC: 0.2 MG/DL (ref 0.2–1)
WBC #/AREA URNS HPF: NORMAL /HPF (ref 0–5)